# Patient Record
Sex: MALE | Race: WHITE | NOT HISPANIC OR LATINO | Employment: FULL TIME | URBAN - METROPOLITAN AREA
[De-identification: names, ages, dates, MRNs, and addresses within clinical notes are randomized per-mention and may not be internally consistent; named-entity substitution may affect disease eponyms.]

---

## 2017-01-11 ENCOUNTER — OFFICE VISIT (OUTPATIENT)
Dept: FAMILY MEDICINE | Facility: CLINIC | Age: 52
End: 2017-01-11
Payer: COMMERCIAL

## 2017-01-11 ENCOUNTER — LAB VISIT (OUTPATIENT)
Dept: LAB | Facility: HOSPITAL | Age: 52
End: 2017-01-11
Attending: FAMILY MEDICINE
Payer: COMMERCIAL

## 2017-01-11 VITALS
RESPIRATION RATE: 18 BRPM | SYSTOLIC BLOOD PRESSURE: 108 MMHG | BODY MASS INDEX: 33.9 KG/M2 | DIASTOLIC BLOOD PRESSURE: 86 MMHG | HEIGHT: 74 IN | HEART RATE: 92 BPM | WEIGHT: 264.13 LBS

## 2017-01-11 DIAGNOSIS — R73.9 HYPERGLYCEMIA: ICD-10-CM

## 2017-01-11 DIAGNOSIS — Z00.00 ROUTINE HEALTH MAINTENANCE: Primary | ICD-10-CM

## 2017-01-11 DIAGNOSIS — I87.2 VENOUS INSUFFICIENCY OF BOTH LOWER EXTREMITIES: ICD-10-CM

## 2017-01-11 PROCEDURE — 83036 HEMOGLOBIN GLYCOSYLATED A1C: CPT

## 2017-01-11 PROCEDURE — 36415 COLL VENOUS BLD VENIPUNCTURE: CPT | Mod: PO

## 2017-01-11 PROCEDURE — 99999 PR PBB SHADOW E&M-EST. PATIENT-LVL III: CPT | Mod: PBBFAC,,, | Performed by: FAMILY MEDICINE

## 2017-01-11 PROCEDURE — 99396 PREV VISIT EST AGE 40-64: CPT | Mod: S$GLB,,, | Performed by: FAMILY MEDICINE

## 2017-01-11 RX ORDER — HYDROCODONE BITARTRATE AND ACETAMINOPHEN 10; 325 MG/1; MG/1
TABLET ORAL
COMMUNITY
Start: 2017-01-03 | End: 2018-07-16 | Stop reason: SDUPTHER

## 2017-01-11 NOTE — PROGRESS NOTES
HPI  Ned Condon is a 51 y.o. male with multiple medical diagnoses as listed in the medical history and problem list that presents for Annual Exam (hm due: tetanus, flu)  .      HPI  Here today for routine health maintenance  Main concern is venous insufficiency.  He is seeing Vascular Medicine.  He is increasing exercise, wearing compression stockings.    PAST MEDICAL HISTORY:  Past Medical History   Diagnosis Date    Colon polyp     JANES (obstructive sleep apnea)      uses cpap    RLS (restless legs syndrome)        PAST SURGICAL HISTORY:  Past Surgical History   Procedure Laterality Date    Cholecystectomy      Hernia repair      Colonoscopy N/A 8/12/2016     Procedure: COLONOSCOPY;  Surgeon: Guanaco Wood MD;  Location: [facility] ENDO;  Service: Endoscopy;  Laterality: N/A;       SOCIAL HISTORY:  Social History     Social History    Marital status:      Spouse name: N/A    Number of children: N/A    Years of education: N/A     Occupational History    Not on file.     Social History Main Topics    Smoking status: Never Smoker    Smokeless tobacco: Never Used    Alcohol use No      Comment: Abstinent since 1980    Drug use: No    Sexual activity: Not on file     Other Topics Concern    Not on file     Social History Narrative       FAMILY HISTORY:  Family History   Problem Relation Age of Onset    Prostate cancer Father     Breast cancer Mother        ALLERGIES AND MEDICATIONS: updated and reviewed.  Review of patient's allergies indicates:   Allergen Reactions    Codeine Itching    Penicillins      Other reaction(s): Unknown  unknown     Current Outpatient Prescriptions   Medication Sig Dispense Refill    diphenhydrAMINE (BENADRYL) 50 MG capsule Take 50 mg by mouth every 6 (six) hours as needed.      fenofibrate 160 MG Tab Take 1 tablet (160 mg total) by mouth once daily. 30 tablet 3    fexofenadine-pseudoephedrine  mg (ALLEGRA-D)  mg per tablet Take 1 tablet by mouth  "2 (two) times daily.      fish oil-omega-3 fatty acids 300-1,000 mg capsule Take 1 capsule (1 g total) by mouth once daily. 30 capsule 11    fluoxetine (PROZAC) 20 MG capsule Take 1 capsule (20 mg total) by mouth once daily. 90 capsule 0    hydrocodone-acetaminophen 10-325mg (NORCO)  mg Tab       levocetirizine (XYZAL) 5 MG tablet Take 1 tablet (5 mg total) by mouth every evening. 30 tablet 11    multivitamin (ONE DAILY MULTIVITAMIN) per tablet Take 1 tablet by mouth once daily.      omeprazole (PRILOSEC) 40 MG capsule Take 1 capsule (40 mg total) by mouth once daily. 30 capsule 11    ropinirole (REQUIP) 1 MG tablet Take 1 tablet (1 mg total) by mouth 3 (three) times daily. 90 tablet 11     No current facility-administered medications for this visit.        ROS  Review of Systems   Constitutional: Negative for appetite change and fatigue.   HENT: Negative for ear pain and hearing loss.    Eyes: Negative for visual disturbance.   Respiratory: Negative for cough, chest tightness and shortness of breath.    Cardiovascular: Negative for chest pain and palpitations.   Gastrointestinal: Negative for abdominal pain, blood in stool, constipation, diarrhea, nausea and vomiting.   Genitourinary: Negative for difficulty urinating.   Musculoskeletal: Negative for back pain, joint swelling, neck pain and neck stiffness.   Skin: Negative.    Neurological: Negative for weakness and headaches.   Psychiatric/Behavioral: Negative for dysphoric mood.       Physical Exam  Vitals:    01/11/17 0909   BP: 108/86   Pulse: 92   Resp: 18    Body mass index is 33.91 kg/(m^2).  Weight: 119.8 kg (264 lb 1.8 oz)   Height: 6' 2" (188 cm)     Physical Exam   Constitutional: He is oriented to person, place, and time. He appears well-developed and well-nourished. No distress.   HENT:   Head: Normocephalic and atraumatic.   Right Ear: External ear normal.   Left Ear: External ear normal.   Eyes: Conjunctivae and EOM are normal. Pupils " are equal, round, and reactive to light. No scleral icterus.   Neck: Normal range of motion. Neck supple. No JVD present. No thyromegaly present.   Cardiovascular: Normal rate, regular rhythm and intact distal pulses.  Exam reveals no gallop and no friction rub.    No murmur heard.  Pulmonary/Chest: Effort normal and breath sounds normal. He has no wheezes. He has no rales.   Abdominal: Soft. Bowel sounds are normal. He exhibits no distension and no mass. There is no tenderness.   Musculoskeletal: Normal range of motion. He exhibits no edema or tenderness.   Lymphadenopathy:     He has no cervical adenopathy.   Neurological: He is alert and oriented to person, place, and time. No cranial nerve deficit. Coordination normal.   Skin: Skin is warm and dry. No rash noted.   Psychiatric: He has a normal mood and affect.   Vitals reviewed.    Lab Results   Component Value Date    WBC 8.55 07/19/2007    HGB 15.3 07/19/2007    HCT 43.4 07/19/2007     07/19/2007    CHOL 155 08/02/2016    TRIG 214 (H) 08/02/2016    HDL 37 (L) 08/02/2016    ALT 28 08/02/2016    AST 20 08/02/2016     08/02/2016    K 4.4 08/02/2016     08/02/2016    CREATININE 1.1 08/02/2016    BUN 12 08/02/2016    CO2 30 (H) 08/02/2016    TSH 1.351 08/02/2016    PSA 0.89 02/19/2015    HGBA1C 5.3 02/26/2015       Health Maintenance       Date Due Completion Date    TETANUS VACCINE 6/14/1984 ---    Influenza Vaccine 8/1/2016 10/29/2007    Lipid Panel 8/2/2021 8/2/2016    Colonoscopy 8/12/2026 8/12/2016          Assessment & Plan    Routine health maintenance  - Health maintenance reviewed  - Diet and exercise education.    Venous insufficiency of both lower extremities  - Elevation and compression  - Await Vascular Medicine                                                                                                                                                                                                                                                                                                                                                                                                                                                 Return in about 1 year (around 1/11/2018).

## 2017-01-12 LAB
ESTIMATED AVG GLUCOSE: 120 MG/DL
HBA1C MFR BLD HPLC: 5.8 %

## 2017-01-26 ENCOUNTER — OFFICE VISIT (OUTPATIENT)
Dept: VASCULAR SURGERY | Facility: CLINIC | Age: 52
End: 2017-01-26
Payer: COMMERCIAL

## 2017-01-26 VITALS
SYSTOLIC BLOOD PRESSURE: 120 MMHG | DIASTOLIC BLOOD PRESSURE: 82 MMHG | RESPIRATION RATE: 16 BRPM | BODY MASS INDEX: 33.88 KG/M2 | HEART RATE: 83 BPM | HEIGHT: 74 IN | WEIGHT: 264 LBS

## 2017-01-26 DIAGNOSIS — I87.2 VENOUS INSUFFICIENCY OF BOTH LOWER EXTREMITIES: Primary | ICD-10-CM

## 2017-01-26 PROCEDURE — 99999 PR PBB SHADOW E&M-EST. PATIENT-LVL III: CPT | Mod: PBBFAC,,, | Performed by: THORACIC SURGERY (CARDIOTHORACIC VASCULAR SURGERY)

## 2017-01-26 PROCEDURE — 99205 OFFICE O/P NEW HI 60 MIN: CPT | Mod: S$GLB,,, | Performed by: THORACIC SURGERY (CARDIOTHORACIC VASCULAR SURGERY)

## 2017-01-26 PROCEDURE — 1159F MED LIST DOCD IN RCRD: CPT | Mod: S$GLB,,, | Performed by: THORACIC SURGERY (CARDIOTHORACIC VASCULAR SURGERY)

## 2017-01-26 NOTE — PROGRESS NOTES
Mr. Condon is a 51-year-old gentleman who has been experiencing pain and edema   of bilateral lower extremities, worse on the right than on the left.  On the   right, he has varicose veins in his calf.  His edema is worse after driving for   significant periods of time.  He works in [place] and drives from here to [place]   and at the end of his trip, his legs are edematous.  He does wear compression   stockings when he drives that distance and wears compression stockings most   days.  Leg elevation helps.    PAST MEDICAL HISTORY:  Colon  polyps, herniated cervical disk, herniated lumbar   disk, obstructive sleep apnea, restless legs syndrome.    PAST SURGICAL HISTORY:  Cholecystectomy, hernia repair and colonoscopy.    ALLERGIES:  Codeine, penicillin.    MEDICATIONS:  Benadryl, fenofibrate, Prozac, Xyzal, multivitamins, Prilosec,   Requip, Allegra-D, Norco.    FAMILY HISTORY:  Prostate cancer, breast cancer.    SOCIAL HISTORY:  He never smoked cigarettes.  He quit drinking alcohol in 1980.    PHYSICAL EXAMINATION:  VITAL SIGNS:  Blood pressure is 120/82, respiratory rate is 16, heart rate is   83, height 6 feet 2 inches, weight 264 pounds.  GENERAL:  He is awake and alert, in no apparent distress.  HEENT:  Head is normocephalic.  Pupils are equal, round and reactive.  Sclerae   are anicteric.  NECK:  Supple.  Trachea midline.  No masses.  LUNGS:  Clear.  HEART:  Has a regular rate and rhythm.  ABDOMEN:  Soft and nontender.  EXTREMITIES:  He has trace edema of bilateral lower extremities.  He has   varicose veins in the right calf.  He has reticular veins of bilateral lower   extremities.  Feet are pink and warm with good capillary refill.  PULSE:  2+ brachial, 2+ radial, 2+ femoral pulses and 2+ dorsalis pedis pulses   bilaterally.  NEUROLOGIC:  Awake, alert and oriented.  No lateralizing neurologic deficits.    Duplex scan of the veins of the lower extremities showed no evidence of DVT.  He   has venous  insufficiency of bilateral greater saphenous veins.  The right   greater saphenous vein measures 6 mm proximally and then it is normal distal to   that.  The left greater saphenous vein measures 5 mm proximally.    IMPRESSION:  1.  Edema of bilateral lower extremities, worse on the right than on the left.  2.  Varicose veins of the right lower extremity.  3.  Venous insufficiency of bilateral greater saphenous veins.  4.  Venous hypertension of bilateral lower extremities.  5.  Obstructive sleep apnea.  6.  Restless legs syndrome.  7.  Herniated disk disease of the cervical spine.  8.  Herniated lumbar disk.    RECOMMENDATIONS:  We discussed conservative therapy versus endovenous laser   ablation of the greater saphenous vein.  For now, we will do conservative   management with exercise, weight loss, leg elevation and compression stockings.    A prescription for knee-high 20-30 mmHg compression stockings was given.  He   will return to see me on a p.r.n. basis.      KACIE  dd: 01/26/2017 16:03:25 (CST)  td: 01/26/2017 21:16:37 (CST)  Doc ID   #[id number]  Job ID #[id number]    CC:

## 2017-01-26 NOTE — MR AVS SNAPSHOT
[facility]  [address] [facility] [address]  [facility] [address]  Phone: [phone]                  Ned Condon   2017 2:45 PM   Office Visit    Description:  Male : 1965   Provider:  Petros Car MD   Department:  [facility]           Reason for Visit     Varicose Veins     Leg Pain     Leg Swelling           Diagnoses this Visit        Comments    Venous insufficiency of both lower extremities    -  Primary            To Do List           Goals (5 Years of Data)     None      [facility]     [facility] On Call Nurse Care Line -  Assistance  Registered nurses in the [facility] Center provide clinical advisement, health education, appointment booking, and other advisory services.  Call for this free service at [phone].             Medications           Message regarding Medications     Verify the changes and/or additions to your medication regime listed below are the same as discussed with your clinician today.  If any of these changes or additions are incorrect, please notify your healthcare provider.             Verify that the below list of medications is an accurate representation of the medications you are currently taking.  If none reported, the list may be blank. If incorrect, please contact your healthcare provider. Carry this list with you in case of emergency.           Current Medications     diphenhydrAMINE (BENADRYL) 50 MG capsule Take 50 mg by mouth every 6 (six) hours as needed.    fenofibrate 160 MG Tab Take 1 tablet (160 mg total) by mouth once daily.    fish oil-omega-3 fatty acids 300-1,000 mg capsule Take 1 capsule (1 g total) by mouth once daily.    fluoxetine (PROZAC) 20 MG capsule Take 1 capsule (20 mg total) by mouth once daily.    levocetirizine (XYZAL) 5 MG tablet Take 1 tablet (5 mg total) by mouth every evening.    multivitamin (ONE DAILY MULTIVITAMIN) per tablet Take 1 tablet by mouth once daily.    omeprazole (PRILOSEC) 40  "MG capsule Take 1 capsule (40 mg total) by mouth once daily.    ropinirole (REQUIP) 1 MG tablet Take 1 tablet (1 mg total) by mouth 3 (three) times daily.    fexofenadine-pseudoephedrine  mg (ALLEGRA-D)  mg per tablet Take 1 tablet by mouth 2 (two) times daily.    hydrocodone-acetaminophen 10-325mg (NORCO)  mg Tab            Clinical Reference Information           Vital Signs - Last Recorded  Most recent update: 1/26/2017  2:44 PM by Yamileth Colvin, RN    BP Pulse Resp Ht Wt BMI    120/82 83 16 6' 2" (1.88 m) 119.7 kg (264 lb) 33.9 kg/m2      Blood Pressure          Most Recent Value    BP  120/82      Allergies as of 1/26/2017     Codeine    Penicillins      Immunizations Administered on Date of Encounter - 1/26/2017     None      "

## 2017-01-26 NOTE — LETTER
January 26, 2017      Jade House NP  [address] [facility] [address] [place] [address]           [facility]  [address] [facility] [address] [place] [address]  Phone: [phone]          Patient: Ned Condon   MR Number: [id number]   YOB: 1965   Date of Visit: 1/26/2017       Dear Jade House:    Thank you for referring Ned Condon to me for evaluation. Attached you will find relevant portions of my assessment and plan of care.    If you have questions, please do not hesitate to call me. I look forward to following Ned Condon along with you.    Sincerely,    Petros Car MD    Enclosure  CC:  No Recipients    If you would like to receive this communication electronically, please contact [email or web address] or [phone] to request more information on [employer] Link access.    For providers and/or their staff who would like to refer a patient to [facility], please contact us through our one-stop-shop provider referral line, Cherie Torres, at [phone].    If you feel you have received this communication in error or would no longer like to receive these types of communications, please e-mail [email or web address]

## 2017-03-21 RX ORDER — FLUOXETINE HYDROCHLORIDE 20 MG/1
20 CAPSULE ORAL DAILY
Qty: 90 CAPSULE | Refills: 3 | Status: SHIPPED | OUTPATIENT
Start: 2017-03-21 | End: 2018-05-28 | Stop reason: SDUPTHER

## 2017-04-17 DIAGNOSIS — E78.5 HYPERLIPIDEMIA, UNSPECIFIED HYPERLIPIDEMIA TYPE: ICD-10-CM

## 2017-04-18 RX ORDER — FENOFIBRATE 160 MG/1
160 TABLET ORAL DAILY
Qty: 90 TABLET | Refills: 2 | Status: SHIPPED | OUTPATIENT
Start: 2017-04-18 | End: 2018-06-05 | Stop reason: SDUPTHER

## 2017-05-08 ENCOUNTER — TELEPHONE (OUTPATIENT)
Dept: FAMILY MEDICINE | Facility: CLINIC | Age: 52
End: 2017-05-08

## 2017-05-08 RX ORDER — TRIAMCINOLONE ACETONIDE 1 MG/G
CREAM TOPICAL 2 TIMES DAILY
Qty: 28.4 G | Refills: 0 | Status: SHIPPED | OUTPATIENT
Start: 2017-05-08 | End: 2018-07-16

## 2017-05-08 NOTE — TELEPHONE ENCOUNTER
----- Message from Iris Ruth sent at 5/8/2017  8:46 AM CDT -----  Contact: self [phone]  He has poison ivy on his leg, he is requesting that you prescribe a steroid.  Please send it in to:   [facility] [place] [facility] [address]   [address] [facility] [address]  Phone: [phone] Fax: [phone]    Thank you!

## 2017-05-08 NOTE — TELEPHONE ENCOUNTER
----- Message from Iris Ruht sent at 5/8/2017 10:23 AM CDT -----  Contact: self [phone]  Call placed to pod. Patient returned your call, please call back.  Thank you!

## 2017-05-08 NOTE — TELEPHONE ENCOUNTER
Called pt, he has poison ivy on legs, and he is requesting a steroid sent to preferred pharmacy. Please advise.

## 2017-07-03 RX ORDER — LEVOCETIRIZINE DIHYDROCHLORIDE 5 MG/1
5 TABLET, FILM COATED ORAL NIGHTLY
Qty: 30 TABLET | Refills: 9 | Status: SHIPPED | OUTPATIENT
Start: 2017-07-03 | End: 2018-05-28 | Stop reason: ALTCHOICE

## 2017-08-18 DIAGNOSIS — G25.81 RLS (RESTLESS LEGS SYNDROME): ICD-10-CM

## 2017-08-21 RX ORDER — ROPINIROLE 1 MG/1
1 TABLET, FILM COATED ORAL 3 TIMES DAILY
Qty: 270 TABLET | Refills: 1 | Status: SHIPPED | OUTPATIENT
Start: 2017-08-21 | End: 2019-02-18 | Stop reason: SDUPTHER

## 2018-05-28 RX ORDER — FEXOFENADINE HCL AND PSEUDOEPHEDRINE HCI 60; 120 MG/1; MG/1
1 TABLET, EXTENDED RELEASE ORAL 2 TIMES DAILY
Qty: 90 TABLET | Refills: 0 | Status: SHIPPED | OUTPATIENT
Start: 2018-05-28

## 2018-05-28 RX ORDER — FLUOXETINE HYDROCHLORIDE 20 MG/1
20 CAPSULE ORAL DAILY
Qty: 90 CAPSULE | Refills: 0 | Status: SHIPPED | OUTPATIENT
Start: 2018-05-28 | End: 2018-08-28 | Stop reason: SDUPTHER

## 2018-06-05 DIAGNOSIS — E78.1 HYPERTRIGLYCERIDEMIA: ICD-10-CM

## 2018-06-05 DIAGNOSIS — Z00.00 ANNUAL PHYSICAL EXAM: ICD-10-CM

## 2018-06-05 DIAGNOSIS — E78.5 HYPERLIPIDEMIA, UNSPECIFIED HYPERLIPIDEMIA TYPE: ICD-10-CM

## 2018-06-05 DIAGNOSIS — R73.9 HYPERGLYCEMIA: Primary | ICD-10-CM

## 2018-06-05 DIAGNOSIS — Z00.00 ROUTINE ADULT HEALTH MAINTENANCE: ICD-10-CM

## 2018-06-05 RX ORDER — FENOFIBRATE 160 MG/1
160 TABLET ORAL DAILY
Qty: 90 TABLET | Refills: 1 | Status: SHIPPED | OUTPATIENT
Start: 2018-06-05 | End: 2019-02-18 | Stop reason: SDUPTHER

## 2018-07-16 ENCOUNTER — LAB VISIT (OUTPATIENT)
Dept: LAB | Facility: HOSPITAL | Age: 53
End: 2018-07-16
Attending: NURSE PRACTITIONER
Payer: COMMERCIAL

## 2018-07-16 ENCOUNTER — OFFICE VISIT (OUTPATIENT)
Dept: PRIMARY CARE CLINIC | Facility: CLINIC | Age: 53
End: 2018-07-16
Payer: COMMERCIAL

## 2018-07-16 VITALS
SYSTOLIC BLOOD PRESSURE: 136 MMHG | BODY MASS INDEX: 32.84 KG/M2 | WEIGHT: 264.13 LBS | HEART RATE: 100 BPM | TEMPERATURE: 99 F | HEIGHT: 75 IN | DIASTOLIC BLOOD PRESSURE: 94 MMHG | OXYGEN SATURATION: 97 %

## 2018-07-16 DIAGNOSIS — R61 NIGHT SWEATS: Primary | ICD-10-CM

## 2018-07-16 DIAGNOSIS — R61 NIGHT SWEATS: ICD-10-CM

## 2018-07-16 DIAGNOSIS — R10.13 EPIGASTRIC PAIN: ICD-10-CM

## 2018-07-16 DIAGNOSIS — R94.31 ABNORMAL EKG: ICD-10-CM

## 2018-07-16 PROCEDURE — 3008F BODY MASS INDEX DOCD: CPT | Mod: CPTII,S$GLB,, | Performed by: NURSE PRACTITIONER

## 2018-07-16 PROCEDURE — 99214 OFFICE O/P EST MOD 30 MIN: CPT | Mod: S$GLB,,, | Performed by: NURSE PRACTITIONER

## 2018-07-16 PROCEDURE — 85025 COMPLETE CBC W/AUTO DIFF WBC: CPT

## 2018-07-16 PROCEDURE — 93000 ELECTROCARDIOGRAM COMPLETE: CPT | Mod: S$GLB,,, | Performed by: INTERNAL MEDICINE

## 2018-07-16 PROCEDURE — 80053 COMPREHEN METABOLIC PANEL: CPT

## 2018-07-16 PROCEDURE — 36415 COLL VENOUS BLD VENIPUNCTURE: CPT | Mod: PO

## 2018-07-16 PROCEDURE — 99999 PR PBB SHADOW E&M-EST. PATIENT-LVL IV: CPT | Mod: PBBFAC,,, | Performed by: NURSE PRACTITIONER

## 2018-07-16 RX ORDER — ASPIRIN 81 MG/1
81 TABLET ORAL NIGHTLY
COMMUNITY

## 2018-07-16 NOTE — PROGRESS NOTES
Subjective:       Patient ID: Ned Condon is a 53 y.o. male.    Chief Complaint: Night Sweats    Patient who is new to me presents with a new problem of night sweats. Patient woke up at 10 and 1030 pm with a pain to the epigastric region and covered in sweat. This lasted for a few minutes, he took a baby aspirin, and when symptoms stopped he went back to sleep. His last stress test was 2 years ago. He denies any palpitations, chest pain or SOB. He is concerned that he may have had a heart attack last night and would like to have it checked out. He did take a xanax and benadryl last night, and is unsure if this precipitated symptoms.       Review of Systems   Constitutional: Negative for chills, fatigue and fever.   HENT: Negative for congestion, ear pain, sinus pressure and sore throat.    Respiratory: Negative for chest tightness, shortness of breath and wheezing.    Cardiovascular: Positive for chest pain (epigastric pain). Negative for leg swelling.   Gastrointestinal: Negative for abdominal distention and abdominal pain.   Genitourinary: Negative for dysuria and flank pain.   Skin: Negative for color change and pallor.   Neurological: Negative for light-headedness, numbness and headaches.       Objective:      Physical Exam   Constitutional: He is oriented to person, place, and time. He appears well-developed and well-nourished.   HENT:   Head: Normocephalic and atraumatic.   Right Ear: External ear normal.   Left Ear: External ear normal.   Eyes: Conjunctivae and EOM are normal. Pupils are equal, round, and reactive to light.   Neck: Normal range of motion. Neck supple.   Cardiovascular: Normal rate and regular rhythm.    Pulmonary/Chest: Effort normal and breath sounds normal.   Abdominal: Soft. Bowel sounds are normal.   Musculoskeletal: Normal range of motion.   Neurological: He is alert and oriented to person, place, and time.   Skin: Skin is warm and dry.   Nursing note and vitals reviewed.       Assessment:       1. Night sweats    2. Epigastric pain    3. Abnormal EKG    4. BMI 33.0-33.9,adult        Plan:       Night sweats  -     IN OFFICE EKG 12-LEAD (to Muse)  -     Comprehensive metabolic panel; Future; Expected date: 07/16/2018  -     CBC auto differential; Future; Expected date: 07/16/2018  -     Ambulatory referral to Cardiology    Epigastric pain  -     IN OFFICE EKG 12-LEAD (to Muse)  -     Comprehensive metabolic panel; Future; Expected date: 07/16/2018  -     CBC auto differential; Future; Expected date: 07/16/2018  -     Ambulatory referral to Cardiology    Abnormal EKG  -     IN OFFICE EKG 12-LEAD (to Muse)  -     Comprehensive metabolic panel; Future; Expected date: 07/16/2018  -     CBC auto differential; Future; Expected date: 07/16/2018  -     Ambulatory referral to Cardiology    BMI 33.0-33.9,adult    Reviewed EKG with Dr. Bolden. No acute findings on EKG. Reading abnormal on EKG will refer to cardiology. Discussed if patient starts having chest pain, or SOB to go to the ED for evaluation. Patient to follow up with any new or concerning symptoms.

## 2018-07-16 NOTE — PATIENT INSTRUCTIONS
Epigastric Pain (Uncertain Cause)     Epigastric pain can be a sign of disease in the upper abdomen. Common causes include:  · Acid reflux (stomach acid flowing up into the esophagus)  · Gastritis (irritation of the stomach lining)  · Peptic Ulcer Disease  · Inflammation of the pancreas  · Gallstone  · Infection in the gallbladder  Pain may be dull or burning. It may spread upward to the chest or to the back. There may be other symptoms such as belching, bloating, cramps or hunger pains. There may be weight loss or poor appetite, nausea or vomiting.  Since the diagnosis of your pain is not certain yet, further tests may sometimes be needed. Sometimes the doctor will treat you for the most likely condition to see if there is improvement before doing further tests.  Home care  Medicines  · Antacids help neutralize the normal acids in your stomach. Examples are Maalox, Mylanta, Rolaids, and Tums. If you dont like the liquid, you can also try a chewable one. You may find one works better than another for you. Overuse can cause diarrhea or constipation.  · Acid blockers (H2 blockers) decrease acid production. Examples are cimetidine (Tagamet), famotidine (Pepcid) and ranitidine (Zantac).  · Acid inhibitors (PPIs) decrease acid production in a different way than the blockers. You may find they work better, but can take a little longer to take effect.  Examples are omeprazole (Prilosec), lansoprazole (Prevacid), pantoprazole (Protonix), rabeprazole (Aciphex), and esomeprazole (Nexium).  · Take an antacid 30-60 minutes after eating and at bedtime, but not at the same time as an acid blocker.  · Try not to take NSAIDs. Aspirin may also cause problems, but if taking it for your heart or other medical reasons, talk to your doctor before stopping it; you do not want to cause a worse problem, like a heart attack or stroke.  Diet  · If certain foods seem to cause your spasm, try to avoid them.   · Eat slowly and chew food well  before swallowing. Symptoms of gastritis can be worsened by certain foods. Limit or avoid fatty, fried, and spicy foods, as well as coffee, chocolate, mint, and foods with high acid content such as tomatoes and citrus fruit and juices (orange, grapefruit, lemon).  · Avoid alcohol, caffeine, and tobacco, which can delay healing and worsen your problem.  · Try eating smaller meals with snacks in between  Follow-up care  Follow up with your healthcare provider or as advised.  When to seek medical advice  Call your healthcare provider right away if any of the following occur:  · Stomach pain worsens or moves to the right lower part of the abdomen  · Chest pain appears, or if it worsens or spreads to the chest, back, neck, shoulder, or arm  · Frequent vomiting (cant keep down liquids)  · Blood in the stool or vomit (red or black color)  · Feeling weak or dizzy, fainting, or having trouble breathing  · Fever of 100.4ºF (38ºC) or higher, or as directed by your healthcare provider  · Abdominal swelling  Date Last Reviewed: 9/25/2015  © 8928-2897 [employer]. [address] [place] [address]. All rights reserved. This information is not intended as a substitute for professional medical care. Always follow your healthcare professional's instructions.

## 2018-07-17 LAB
ALBUMIN SERPL BCP-MCNC: 4.5 G/DL
ALP SERPL-CCNC: 53 U/L
ALT SERPL W/O P-5'-P-CCNC: 29 U/L
ANION GAP SERPL CALC-SCNC: 11 MMOL/L
AST SERPL-CCNC: 22 U/L
BASOPHILS # BLD AUTO: 0.03 K/UL
BASOPHILS NFR BLD: 0.3 %
BILIRUB SERPL-MCNC: 0.9 MG/DL
BUN SERPL-MCNC: 12 MG/DL
CALCIUM SERPL-MCNC: 10.7 MG/DL
CHLORIDE SERPL-SCNC: 101 MMOL/L
CO2 SERPL-SCNC: 24 MMOL/L
CREAT SERPL-MCNC: 1 MG/DL
DIFFERENTIAL METHOD: ABNORMAL
EOSINOPHIL # BLD AUTO: 0.1 K/UL
EOSINOPHIL NFR BLD: 0.6 %
ERYTHROCYTE [DISTWIDTH] IN BLOOD BY AUTOMATED COUNT: 12.1 %
EST. GFR  (AFRICAN AMERICAN): >60 ML/MIN/1.73 M^2
EST. GFR  (NON AFRICAN AMERICAN): >60 ML/MIN/1.73 M^2
GLUCOSE SERPL-MCNC: 131 MG/DL
HCT VFR BLD AUTO: 44.6 %
HGB BLD-MCNC: 15.4 G/DL
IMM GRANULOCYTES # BLD AUTO: 0.05 K/UL
IMM GRANULOCYTES NFR BLD AUTO: 0.5 %
LYMPHOCYTES # BLD AUTO: 3.5 K/UL
LYMPHOCYTES NFR BLD: 32 %
MCH RBC QN AUTO: 31.7 PG
MCHC RBC AUTO-ENTMCNC: 34.5 G/DL
MCV RBC AUTO: 92 FL
MONOCYTES # BLD AUTO: 0.9 K/UL
MONOCYTES NFR BLD: 8.1 %
NEUTROPHILS # BLD AUTO: 6.3 K/UL
NEUTROPHILS NFR BLD: 58.5 %
NRBC BLD-RTO: 0 /100 WBC
PLATELET # BLD AUTO: 269 K/UL
PMV BLD AUTO: 13.3 FL
POTASSIUM SERPL-SCNC: 4.7 MMOL/L
PROT SERPL-MCNC: 8 G/DL
RBC # BLD AUTO: 4.86 M/UL
SODIUM SERPL-SCNC: 136 MMOL/L
WBC # BLD AUTO: 10.8 K/UL

## 2018-07-17 NOTE — PROGRESS NOTES
Please call the patient and let him know that his blood work is all within an acceptable range. Thanks.

## 2018-07-18 NOTE — PROGRESS NOTES
Please call the patient and let him know the cardiologist read the EKG and compared it to the previous one on file and stated there was no changes from the previous one. This is good. I still will like him to follow up with the cardiologist. Thanks.

## 2018-08-28 RX ORDER — FLUOXETINE HYDROCHLORIDE 20 MG/1
20 CAPSULE ORAL DAILY
Qty: 90 CAPSULE | Refills: 0 | Status: SHIPPED | OUTPATIENT
Start: 2018-08-28 | End: 2019-02-18 | Stop reason: SDUPTHER

## 2018-08-28 RX ORDER — LEVOCETIRIZINE DIHYDROCHLORIDE 5 MG/1
5 TABLET, FILM COATED ORAL NIGHTLY
Qty: 90 TABLET | Refills: 0 | Status: SHIPPED | OUTPATIENT
Start: 2018-08-28 | End: 2019-08-28

## 2019-02-18 DIAGNOSIS — E78.5 HYPERLIPIDEMIA, UNSPECIFIED HYPERLIPIDEMIA TYPE: ICD-10-CM

## 2019-02-18 DIAGNOSIS — G25.81 RLS (RESTLESS LEGS SYNDROME): ICD-10-CM

## 2019-02-18 RX ORDER — FLUOXETINE HYDROCHLORIDE 20 MG/1
20 CAPSULE ORAL DAILY
Qty: 90 CAPSULE | Refills: 0 | Status: SHIPPED | OUTPATIENT
Start: 2019-02-18

## 2019-02-18 RX ORDER — FENOFIBRATE 160 MG/1
160 TABLET ORAL DAILY
Qty: 90 TABLET | Refills: 0 | Status: SHIPPED | OUTPATIENT
Start: 2019-02-18 | End: 2019-05-19

## 2019-02-18 RX ORDER — ROPINIROLE 1 MG/1
1 TABLET, FILM COATED ORAL NIGHTLY
Qty: 90 TABLET | Refills: 0 | Status: SHIPPED | OUTPATIENT
Start: 2019-02-18 | End: 2019-05-19

## 2019-02-18 NOTE — TELEPHONE ENCOUNTER
----- Message from Jairo Gilmore sent at 2/18/2019  9:36 AM CST -----  Contact: Patient  Advised needs a refill on:  fenofibrate 160 MG Tab  ropinirole (REQUIP) 1 MG tablet   FLUoxetine (PROZAC) 20 MG capsule  Ptnt is completely out of these medications.  Please call [phone]      [facility] [place] [address] [facility] [address]  Phone: [phone] Fax: [phone]

## 2020-05-06 ENCOUNTER — TELEPHONE (OUTPATIENT)
Dept: FAMILY MEDICINE | Facility: CLINIC | Age: 55
End: 2020-05-06

## 2020-05-06 DIAGNOSIS — G47.33 OSA (OBSTRUCTIVE SLEEP APNEA): Primary | ICD-10-CM

## 2020-05-06 NOTE — TELEPHONE ENCOUNTER
----- Message from Jairo Gilmore sent at 5/6/2020  7:32 AM CDT -----  Contact: Ptnt   [phone]  Type: Needs Medical Advice    Who Called:  Ptnt   [phone]    Additional Information:     Advised needs new CPAP equipment, currently in [place], see chart for temp address. Please call to discuss.

## 2020-05-06 NOTE — TELEPHONE ENCOUNTER
Left message to return call. Need to know where to send Rx.  CPAP supply orders are in telephone encounter for 5/06.

## 2020-05-07 ENCOUNTER — TELEPHONE (OUTPATIENT)
Dept: FAMILY MEDICINE | Facility: CLINIC | Age: 55
End: 2020-05-07

## 2020-05-07 DIAGNOSIS — G47.33 OSA (OBSTRUCTIVE SLEEP APNEA): Primary | ICD-10-CM

## 2020-05-07 NOTE — TELEPHONE ENCOUNTER
----- Message from Chen Lujan sent at 5/6/2020  5:27 PM CDT -----  Contact: Pt  Pt would like a call back regarding missed call from office.     Pt stated RX can be sent to:  [address]   [address] [address]   [place] [address]    Pt also stated its the whole CPAP machine that is broken    Pt can be reached at [phone]

## 2020-05-07 NOTE — TELEPHONE ENCOUNTER
----- Message from Eli Rojas sent at 5/7/2020  9:07 AM CDT -----  Contact: Ned figueora  Type: Needs Medical Advice  Who Called:  Ned Levi Call Back Number: [phone]  Additional Information: Pls have Alicia call pt regarding c-pap